# Patient Record
Sex: FEMALE | Race: WHITE
[De-identification: names, ages, dates, MRNs, and addresses within clinical notes are randomized per-mention and may not be internally consistent; named-entity substitution may affect disease eponyms.]

---

## 2020-01-14 ENCOUNTER — HOSPITAL ENCOUNTER (EMERGENCY)
Dept: HOSPITAL 25 - UCCORT | Age: 60
Discharge: HOME | End: 2020-01-14
Payer: COMMERCIAL

## 2020-01-14 VITALS — DIASTOLIC BLOOD PRESSURE: 80 MMHG | SYSTOLIC BLOOD PRESSURE: 136 MMHG

## 2020-01-14 DIAGNOSIS — N39.0: Primary | ICD-10-CM

## 2020-01-14 DIAGNOSIS — Z88.2: ICD-10-CM

## 2020-01-14 DIAGNOSIS — Z88.0: ICD-10-CM

## 2020-01-14 DIAGNOSIS — Z87.891: ICD-10-CM

## 2020-01-14 PROCEDURE — 81003 URINALYSIS AUTO W/O SCOPE: CPT

## 2020-01-14 PROCEDURE — 99202 OFFICE O/P NEW SF 15 MIN: CPT

## 2020-01-14 PROCEDURE — 87086 URINE CULTURE/COLONY COUNT: CPT

## 2020-01-14 PROCEDURE — G0463 HOSPITAL OUTPT CLINIC VISIT: HCPCS

## 2020-01-14 NOTE — UC
Complaint Female HPI





- History Of Current Complaint


Stated Complaint: URINARY COMPLAINT


Time Seen by Provider: 01/14/20 14:20


Hx Last Menstrual Period: 20 yrs





- Allergies/Home Medications


Allergies/Adverse Reactions: 


 Allergies











Allergy/AdvReac Type Severity Reaction Status Date / Time


 


MS Sulfa Drugs [Sulfa Drugs] Allergy Unknown Hives Verified 01/21/15 12:26


 


MS Penicillins [Penicillins] Allergy  Hives Verified 01/21/15 12:26














PMH/Surg Hx/FS Hx/Imm Hx





- Additional Past Medical History


Additional PMH: 





BRCA





- Surgical History


Surgical History: Yes


Surgery Procedure, Year, and Place: Right Breast Lumpectomy and Left Breast 

Reduction, 10/17/14.  Colon and Stomach Polyps , ~2012.  Gastric Bypass, 2010, 

Dr. Fay.  Total Hysterectomy, 1996





- Family History


Known Family History: Positive: Hypertension





- Social History


Lives: With Family


Alcohol Use: Rare


Substance Use Type: None


Smoking Status (MU): Former Smoker


Type: Cigarettes


Amount Used/How Often: 1 PPD


Length of Time of Smoking/Using Tobacco: 10 Years


Have You Smoked in the Last Year: No


When Did the Patient Quit Smoking/Using Tobacco: ~1995





- Immunization History


Most Recent Influenza Vaccination: 10/27/14





Review of Systems


All Other Systems Reviewed And Are Negative: No


Constitutional: Positive: Negative


Skin: Positive: Negative


Respiratory: Positive: Negative


Cardiovascular: Positive: Negative


Genitourinary: Positive: Dysuria


Neurological: Positive: Negative


Psychological: Positive: Negative





Physical Exam





- Summary


Physical Exam Summary: 





GENERAL: NAD. WDWN. No pain distress.


SKIN: No rashes, sores, lesions, or open wounds.


NECK: Supple. Nontender. No lymphadenopathy. 


CHEST:  CTAB. No r/r/w. No accessory muscle use. Breathing comfortably and in 

no distress.


CV:  RRR. Pulses intact. Cap refill <2seconds


ABDOMEN:  Soft. NTTP. No distention or guarding. No organomegaly. No CVA 

tenderness. Bowel sounds present


NEURO: Alert. 


PSYCH: Age appropriate behavior.


Triage Information Reviewed: Yes


Vital Signs Reviewed: Yes





Discharge ED





- Sign-Out/Discharge


Documenting (check all that apply): Patient Departure


All imaging exams completed and their final reports reviewed: No Studies





- Discharge Plan


Condition: Stable


Disposition: HOME


Referrals: 


Reji Meade MD [Primary Care Provider] - 





- Billing Disposition and Condition


Condition: STABLE


Disposition: Home

## 2020-01-14 NOTE — UC
Complaint Female HPI





- HPI Summary


HPI Summary: 





60 yo woman with hx of DCIS, with onset yesterday of typical urinary tract 

symptoms of dysuria and frequency. No fever or chills; stools have been loose. 

No nausea or vomiting. 





- History Of Current Complaint


Stated Complaint: URINARY COMPLAINT


Time Seen by Provider: 01/14/20 14:20


Hx Obtained From: Patient


Hx Last Menstrual Period: 20 yrs


Onset/Duration: Sudden Onset


Timing: Intermittent, Lasting Minutes


Severity Initially: Mild


Severity Currently: Moderate


Character: Burning


Aggravating Factor(s): Urination


Alleviating Factor(s): Nothing


Associated Signs And Symptoms: Positive: Negative


Related Hx: Similar Episode/Dx as: - UTI





- Risk Factors


Ectopic Pregnancy Risk Factor: Negative


Ovarian Torsion Risk Factor: Negative





- Allergies/Home Medications


Allergies/Adverse Reactions: 


 Allergies











Allergy/AdvReac Type Severity Reaction Status Date / Time


 


Penicillins Allergy  Hives Verified 01/14/20 14:38


 


Sulfa (Sulfonamide Allergy  Hives Verified 01/14/20 14:38





Antibiotics)     














PMH/Surg Hx/FS Hx/Imm Hx


Cancer History: Breast Cancer - hx of DCIS





- Surgical History


Surgical History: Yes


Surgery Procedure, Year, and Place: Right Breast Lumpectomy and Left Breast 

Reduction, 10/17/14.  Colon and Stomach Polyps , ~2012.  Gastric Bypass, 2010, 

Dr. Fay.  Total Hysterectomy, 1996





- Family History


Known Family History: Positive: Non-Contributory





- Social History


Occupation: Retired


Lives: With Family


Alcohol Use: "maybe a glass a month"


Substance Use Type: None


Smoking Status (MU): Former Smoker


Type: Cigarettes


Amount Used/How Often: 1 PPD


Length of Time of Smoking/Using Tobacco: 10 Years


Have You Smoked in the Last Year: No


When Did the Patient Quit Smoking/Using Tobacco: ~1995





- Immunization History


Most Recent Influenza Vaccination: 10/27/14





Review of Systems


All Other Systems Reviewed And Are Negative: Yes


Constitutional: Positive: Negative


Skin: Positive: Negative


Eyes: Positive: Negative


ENT: Positive: Negative


Respiratory: Positive: Negative


Cardiovascular: Positive: Negative


Gastrointestinal: Positive: Abdominal Pain - suprapubic cramping


Genitourinary: Positive: Dysuria, Frequency.  Negative: Hematuria


Motor: Positive: Negative


Neurovascular: Positive: Negative


Musculoskeletal: Positive: Negative


Neurological: Positive: Negative


Psychological: Positive: Negative


Is Patient Immunocompromised?: No





Physical Exam


Triage Information Reviewed: Yes


Appearance: Well-Appearing, No Pain Distress, Obese


ENT: Positive: Pharynx normal


Neck: Positive: Supple, Nontender, No Lymphadenopathy


Respiratory: Positive: Lungs clear, Normal breath sounds


Abdomen Description: Positive: No Organomegaly, Soft, Other: - + suprapubic 

tenderness.  Negative: CVA Tenderness (R), CVA Tenderness (L), Distended, 

Guarding


Bowel Sounds: Positive: Present


Musculoskeletal Exam: Normal


Neurological Exam: Normal


Psychological Exam: Normal


Skin Exam: Normal





Diagnostics





- Laboratory


Lab Results: 





UA with 1+ esterace





 Complaint Female Dx





- Course


Course Of Treatment: 





macrobid for treatment of UTI. Increase fluids. 





- Differential Dx/Diagnosis


Differential Diagnosis/HQI/PQRI: Urinary Tract Infection


Provider Diagnosis: 


 UTI (urinary tract infection)








Discharge ED





- Sign-Out/Discharge


Documenting (check all that apply): Patient Departure


All imaging exams completed and their final reports reviewed: No Studies





- Discharge Plan


Condition: Stable


Disposition: HOME


Prescriptions: 


Nitrofurantoin Monohyd/M-Cryst [Macrobid 100 mg Capsule] 100 mg PO BID #14 cap


Patient Education Materials:  Urinary Tract Infection in Women (ED)


Referrals: 


Reji Meade MD [Primary Care Provider] - 


Additional Instructions: 


Urine culture will be sent and you will receive a call if a change of 

antibiotic is needed based on the report of the culture. 


Ensure a high intake of fluids. 


Follow up with Dr. Meade if you have persistent symptoms. 





- Billing Disposition and Condition


Condition: STABLE


Disposition: Home

## 2020-01-17 NOTE — UC
- Progress Note


Progress Note: 





negative cultures stop antibiotics follow up with PCP





Course/Dx





- Diagnoses


Provider Diagnoses: 


 UTI (urinary tract infection)








Discharge ED





- Sign-Out/Discharge


Documenting (check all that apply): Patient Departure


All imaging exams completed and their final reports reviewed: No Studies





- Discharge Plan


Condition: Good


Disposition: HOME


Prescriptions: 


Nitrofurantoin Monohyd/M-Cryst [Macrobid 100 mg Capsule] 100 mg PO BID #14 cap


Patient Education Materials:  Urinary Tract Infection in Women (ED)


Referrals: 


Reji Meade MD [Primary Care Provider] - 


Additional Instructions: 


Urine culture will be sent and you will receive a call if a change of 

antibiotic is needed based on the report of the culture. 


Ensure a high intake of fluids. 


Follow up with Dr. Meade if you have persistent symptoms. 





- Billing Disposition and Condition


Condition: GOOD


Disposition: Home